# Patient Record
Sex: MALE | Race: WHITE | Employment: FULL TIME | URBAN - METROPOLITAN AREA
[De-identification: names, ages, dates, MRNs, and addresses within clinical notes are randomized per-mention and may not be internally consistent; named-entity substitution may affect disease eponyms.]

---

## 2017-06-27 ENCOUNTER — THERAPY VISIT (OUTPATIENT)
Dept: PHYSICAL THERAPY | Facility: CLINIC | Age: 27
End: 2017-06-27
Payer: COMMERCIAL

## 2017-06-27 DIAGNOSIS — M67.88 ACHILLES TENDONOSIS OF LEFT LOWER EXTREMITY: Primary | ICD-10-CM

## 2017-06-27 PROCEDURE — 97035 APP MDLTY 1+ULTRASOUND EA 15: CPT | Mod: GP | Performed by: PHYSICAL THERAPIST

## 2017-06-27 PROCEDURE — 97110 THERAPEUTIC EXERCISES: CPT | Mod: GP | Performed by: PHYSICAL THERAPIST

## 2017-06-27 PROCEDURE — 97161 PT EVAL LOW COMPLEX 20 MIN: CPT | Mod: GP | Performed by: PHYSICAL THERAPIST

## 2017-06-27 PROCEDURE — 97140 MANUAL THERAPY 1/> REGIONS: CPT | Mod: GP | Performed by: PHYSICAL THERAPIST

## 2017-06-27 NOTE — MR AVS SNAPSHOT
After Visit Summary   6/27/2017    Yuniel Sultana    MRN: [id number]           Patient Information     Date Of Birth          1990        Visit Information        Provider Department      6/27/2017 4:50 PM Jemal Hendrix, [facility] [place] [facility]        Today's Diagnoses     Achilles tendonosis of left lower extremity    -  1       Follow-ups after your visit        Your next 10 appointments already scheduled     Jul 07, 2017  3:10 PM CDT   KERRIE Extremity with Jemal Hendrix PT   [facility] [place] [facility] ([facility] [place])    [address] [place] [address]  [address]  [place] [address]   [phone]            Jul 10, 2017  3:50 PM CDT   KERRIE Extremity with Jemal Hendrix PT   [facility] [place] [facility] ([facility] [place])    [address] [place] [address]  [address]  [place] [address]   [phone]            Jul 12, 2017  4:30 PM CDT   KERRIE Extremity with Jemal Hendrix PT   [facility] [place] [facility] ([facility] [place])    [address] [place] [address]  [address]  [place] [address]   [phone]              Who to contact     If you have questions or need follow up information about today's clinic visit or your schedule please contact [facility] directly at [phone].  Normal or non-critical lab and imaging results will be communicated to you by MyChart, letter or phone within 4 business days after the clinic has received the results. If you do not hear from us within 7 days, please contact the clinic through [email or web address] or phone. If you have a critical or abnormal lab result, we will notify you by phone as soon as possible.  Submit refill requests through [employer] or call your pharmacy and they will forward the refill request to us. Please allow 3 business days for your refill to be completed.          Additional  "Information About Your Visit        MyChart Information     [employer] lets you send messages to your doctor, view your test results, renew your prescriptions, schedule appointments and more. To sign up, go to [email or web address] . Click on \"Log in\" on the left side of the screen, which will take you to the Welcome page. Then click on \"Sign up Now\" on the right side of the page.     You will be asked to enter the access code listed below, as well as some personal information. Please follow the directions to create your username and password.     Your access code is: [email or web address]  Expires: 2017  8:14 AM     Your access code will  in 90 days. If you need help or a new code, please call your [facility] clinic or [phone].        Care EveryWhere ID     This is your Care EveryWhere ID. This could be used by other organizations to access your [facility] medical records  [id number]         Blood Pressure from Last 3 Encounters:   10/03/16 110/80   14 118/70   13 147/82    Weight from Last 3 Encounters:   10/03/16 78 kg (172 lb)   13 74.8 kg (165 lb)              We Performed the Following     HC PT EVAL, LOW COMPLEXITY     KERRIE INITIAL EVAL REPORT     MANUAL THER TECH,1+REGIONS,EA 15 MIN     THERAPEUTIC EXERCISES     ULTRASOUND THERAPY        Primary Care Provider Office Phone # Fax #    Leonora Terry [phone]       [facility] [address] [place] [address]        Equal Access to Services     TREMAYNE BENJAMIN : Hadii bethany saravia hadlittleo Sokleberali, waaxda luqadaha, qaybta kaalmada adeegyada, jem tidwell. So [facility] [phone].    ATENCIÓN: Si habla español, tiene a magaña disposición servicios gratuitos de asistencia lingüística. Llame al [phone].    We comply with applicable federal civil rights laws and [place] laws. We do not discriminate on the basis of race, color, national origin, age, disability sex, sexual orientation or " gender identity.            Thank you!     Thank you for choosing [facility]  [facility]. Our goal is always to provide you with excellent care. Hearing back from our patients is one way we can continue to improve our services. Please take a few minutes to complete the written survey that you may receive in the mail after your visit with us. Thank you!             Your Updated Medication List - Protect others around you: Learn how to safely use, store and throw away your medicines at www.disposemymeds.org.          This list is accurate as of: 6/27/17 11:59 PM.  Always use your most recent med list.                   Brand Name Dispense Instructions for use Diagnosis    nabumetone 500 MG tablet    RELAFEN    30 tablet    Take 1-2 tablets (500-1,000 mg) by mouth 2 times daily as needed for moderate pain    Other back pain, unspecified chronicity, Left Achilles tendinitis       SYNTHROID PO      Take  by mouth.

## 2017-06-28 PROBLEM — M67.88 ACHILLES TENDONOSIS OF LEFT LOWER EXTREMITY: Status: ACTIVE | Noted: 2017-06-28

## 2017-06-28 NOTE — PROGRESS NOTES
Subjective:    Patient is a 27 year old male presenting with rehab left ankle/foot hpi.   Yuniel Sultana is a 27 year old male with a left ankle condition.  Condition occurred with:  Insidious onset.  Condition occurred: for unknown reasons.  This is a chronic condition  Patient is referred with a 10 month hx of L Achilles pain which worsened in the past 4 months. He notes pain in the morning and after sitting when attempting to walk. Descending stairs also increases pain..    Patient reports pain:  Posterior (L Achilles MT jct).  Radiates to:  No radiation.  Pain is described as sharp and is intermittent and reported as 9/10.  Associated symptoms:  Loss of strength and edema. Pain is worse during the day.  Symptoms are exacerbated by activity, walking and descending stairs and relieved by nothing.  Since onset symptoms are unchanged.        General health as reported by patient is excellent.          Current occupation is [employer].  Patient is working in normal job without restrictions.          Red flags:  None as reported by patient.                        Objective:    Standing Alignment:                Ankle/foot deviations: Mild hyperpronation B.    Gait:    Gait Type:  Antalgic     Deviations:  Ankle:  Pronation incr L, pronation incr R and push off decr L          Ankle/Foot Evaluation  ROM:  AROM is normal.PROM is normal.      Strength:    Dorsiflexion:  Left: 5/5     Pain:   Right: 5/5   Pain:  Plantarflexion: Left: 4/5    Pain:++   Right: 5/5  Pain:  Inversion:Left: 5/5  Pain:     Right: 5/5  Pain:  Eversion:Left: 5/5  Pain:  Right: 5/5  Pain:                  LIGAMENT TESTING: not assessed              SPECIAL TESTS: not assessed    PALPATION:   Left ankle tenderness present at:  achilles tendon    EDEMA: Edema ankle: Mild thickening at MT jct left.          MOBILITY TESTING: normal              FUNCTIONAL TESTS: not assessed                                                              General      ROS    Assessment/Plan:      Patient is a 27 year old male with left side ankle complaints.    Patient has the following significant findings with corresponding treatment plan.                Diagnosis 1:  Left Achilles tendinosis  Pain -  US, electric stimulation, manual therapy, splint/taping/bracing/orthotics, self management, education and home program  Decreased strength - therapeutic exercise, therapeutic activities and home program  Impaired gait - gait training and home program  Impaired muscle performance - neuro re-education and home program  Decreased function - therapeutic activities and home program    Therapy Evaluation Codes:   1) History comprised of:   Personal factors that impact the plan of care:      None.    Comorbidity factors that impact the plan of care are:      None.     Medications impacting care: None.  2) Examination of Body Systems comprised of:   Body structures and functions that impact the plan of care:      Ankle.   Activity limitations that impact the plan of care are:      Jumping, Sports, Stairs, Walking and Working.  3) Clinical presentation characteristics are:   Stable/Uncomplicated.  4) Decision-Making    Low complexity using standardized patient assessment instrument and/or measureable assessment of functional outcome.  Cumulative Therapy Evaluation is: Low complexity.    Previous and current functional limitations:  (See Goal Flow Sheet for this information)    Short term and Long term goals: (See Goal Flow Sheet for this information)     Communication ability:  Patient appears to be able to clearly communicate and understand verbal and written communication and follow directions correctly.  Treatment Explanation - The following has been discussed with the patient:   RX ordered/plan of care  Anticipated outcomes  Possible risks and side effects  This patient would benefit from PT intervention to resume normal activities.   Rehab potential is excellent.    Frequency:  2  X week, once daily  Duration:  for 4 weeks  Discharge Plan:  Achieve all LTG.  Independent in home treatment program.  Reach maximal therapeutic benefit.    Please refer to the daily flowsheet for treatment today, total treatment time and time spent performing 1:1 timed codes.

## 2017-07-07 ENCOUNTER — THERAPY VISIT (OUTPATIENT)
Dept: PHYSICAL THERAPY | Facility: CLINIC | Age: 27
End: 2017-07-07
Payer: COMMERCIAL

## 2017-07-07 DIAGNOSIS — M67.88 ACHILLES TENDONOSIS OF LEFT LOWER EXTREMITY: ICD-10-CM

## 2017-07-07 PROCEDURE — 97110 THERAPEUTIC EXERCISES: CPT | Mod: GP | Performed by: PHYSICAL THERAPIST

## 2017-07-07 PROCEDURE — 97140 MANUAL THERAPY 1/> REGIONS: CPT | Mod: GP | Performed by: PHYSICAL THERAPIST

## 2017-07-07 PROCEDURE — 97035 APP MDLTY 1+ULTRASOUND EA 15: CPT | Mod: GP | Performed by: PHYSICAL THERAPIST

## 2017-07-10 ENCOUNTER — THERAPY VISIT (OUTPATIENT)
Dept: PHYSICAL THERAPY | Facility: CLINIC | Age: 27
End: 2017-07-10
Payer: COMMERCIAL

## 2017-07-10 DIAGNOSIS — M67.88 ACHILLES TENDONOSIS OF LEFT LOWER EXTREMITY: ICD-10-CM

## 2017-07-10 PROCEDURE — 97035 APP MDLTY 1+ULTRASOUND EA 15: CPT | Mod: GP | Performed by: PHYSICAL THERAPIST

## 2017-07-10 PROCEDURE — 97110 THERAPEUTIC EXERCISES: CPT | Mod: GP | Performed by: PHYSICAL THERAPIST

## 2017-07-10 PROCEDURE — 97140 MANUAL THERAPY 1/> REGIONS: CPT | Mod: GP | Performed by: PHYSICAL THERAPIST

## 2017-07-17 ENCOUNTER — THERAPY VISIT (OUTPATIENT)
Dept: PHYSICAL THERAPY | Facility: CLINIC | Age: 27
End: 2017-07-17
Payer: COMMERCIAL

## 2017-07-17 DIAGNOSIS — M67.88 ACHILLES TENDONOSIS OF LEFT LOWER EXTREMITY: ICD-10-CM

## 2017-07-17 PROCEDURE — 97110 THERAPEUTIC EXERCISES: CPT | Mod: GP | Performed by: PHYSICAL THERAPIST

## 2017-07-17 PROCEDURE — 97140 MANUAL THERAPY 1/> REGIONS: CPT | Mod: GP | Performed by: PHYSICAL THERAPIST

## 2017-07-17 PROCEDURE — 97014 ELECTRIC STIMULATION THERAPY: CPT | Mod: GP | Performed by: PHYSICAL THERAPIST

## 2017-07-17 PROCEDURE — 97010 HOT OR COLD PACKS THERAPY: CPT | Mod: GP | Performed by: PHYSICAL THERAPIST

## 2017-07-21 ENCOUNTER — THERAPY VISIT (OUTPATIENT)
Dept: PHYSICAL THERAPY | Facility: CLINIC | Age: 27
End: 2017-07-21
Payer: COMMERCIAL

## 2017-07-21 DIAGNOSIS — M67.88 ACHILLES TENDONOSIS OF LEFT LOWER EXTREMITY: ICD-10-CM

## 2017-07-21 PROCEDURE — 97140 MANUAL THERAPY 1/> REGIONS: CPT | Mod: GP | Performed by: PHYSICAL THERAPIST

## 2017-07-21 PROCEDURE — 97010 HOT OR COLD PACKS THERAPY: CPT | Mod: GP | Performed by: PHYSICAL THERAPIST

## 2017-07-21 PROCEDURE — 97014 ELECTRIC STIMULATION THERAPY: CPT | Mod: GP | Performed by: PHYSICAL THERAPIST

## 2017-07-21 PROCEDURE — 97530 THERAPEUTIC ACTIVITIES: CPT | Mod: GP | Performed by: PHYSICAL THERAPIST

## 2017-07-24 ENCOUNTER — THERAPY VISIT (OUTPATIENT)
Dept: PHYSICAL THERAPY | Facility: CLINIC | Age: 27
End: 2017-07-24
Payer: COMMERCIAL

## 2017-07-24 DIAGNOSIS — M67.88 ACHILLES TENDONOSIS OF LEFT LOWER EXTREMITY: ICD-10-CM

## 2017-07-24 PROCEDURE — 97110 THERAPEUTIC EXERCISES: CPT | Mod: GP | Performed by: PHYSICAL THERAPIST

## 2017-07-24 PROCEDURE — 97140 MANUAL THERAPY 1/> REGIONS: CPT | Mod: GP | Performed by: PHYSICAL THERAPIST

## 2017-07-28 ENCOUNTER — THERAPY VISIT (OUTPATIENT)
Dept: PHYSICAL THERAPY | Facility: CLINIC | Age: 27
End: 2017-07-28
Payer: COMMERCIAL

## 2017-07-28 DIAGNOSIS — M67.88 ACHILLES TENDONOSIS OF LEFT LOWER EXTREMITY: ICD-10-CM

## 2017-07-28 PROCEDURE — 97110 THERAPEUTIC EXERCISES: CPT | Mod: GP | Performed by: PHYSICAL THERAPIST

## 2017-07-28 PROCEDURE — 97140 MANUAL THERAPY 1/> REGIONS: CPT | Mod: GP | Performed by: PHYSICAL THERAPIST

## 2017-07-31 ENCOUNTER — THERAPY VISIT (OUTPATIENT)
Dept: PHYSICAL THERAPY | Facility: CLINIC | Age: 27
End: 2017-07-31
Payer: COMMERCIAL

## 2017-07-31 DIAGNOSIS — M67.88 ACHILLES TENDONOSIS OF LEFT LOWER EXTREMITY: ICD-10-CM

## 2017-07-31 PROCEDURE — 97530 THERAPEUTIC ACTIVITIES: CPT | Mod: GP | Performed by: PHYSICAL THERAPIST

## 2017-07-31 PROCEDURE — 97140 MANUAL THERAPY 1/> REGIONS: CPT | Mod: GP | Performed by: PHYSICAL THERAPIST

## 2017-07-31 PROCEDURE — 97010 HOT OR COLD PACKS THERAPY: CPT | Mod: GP | Performed by: PHYSICAL THERAPIST

## 2017-07-31 PROCEDURE — 97014 ELECTRIC STIMULATION THERAPY: CPT | Mod: GP | Performed by: PHYSICAL THERAPIST

## 2017-08-04 ENCOUNTER — THERAPY VISIT (OUTPATIENT)
Dept: PHYSICAL THERAPY | Facility: CLINIC | Age: 27
End: 2017-08-04
Payer: COMMERCIAL

## 2017-08-04 DIAGNOSIS — M67.88 ACHILLES TENDONOSIS OF LEFT LOWER EXTREMITY: ICD-10-CM

## 2017-08-04 PROCEDURE — 97140 MANUAL THERAPY 1/> REGIONS: CPT | Mod: GP | Performed by: PHYSICAL THERAPIST

## 2017-08-04 PROCEDURE — 97035 APP MDLTY 1+ULTRASOUND EA 15: CPT | Mod: GP | Performed by: PHYSICAL THERAPIST

## 2017-08-04 PROCEDURE — 97014 ELECTRIC STIMULATION THERAPY: CPT | Mod: GP | Performed by: PHYSICAL THERAPIST

## 2017-08-04 PROCEDURE — 97010 HOT OR COLD PACKS THERAPY: CPT | Mod: GP | Performed by: PHYSICAL THERAPIST

## 2017-08-04 PROCEDURE — 97530 THERAPEUTIC ACTIVITIES: CPT | Mod: GP | Performed by: PHYSICAL THERAPIST

## 2017-10-01 ENCOUNTER — HOSPITAL ENCOUNTER (EMERGENCY)
Facility: CLINIC | Age: 27
Discharge: HOME | End: 2017-10-01
Attending: EMERGENCY MEDICINE | Admitting: EMERGENCY MEDICINE
Payer: COMMERCIAL

## 2017-10-01 ENCOUNTER — APPOINTMENT (OUTPATIENT)
Dept: CT IMAGING | Facility: CLINIC | Age: 27
End: 2017-10-01
Attending: EMERGENCY MEDICINE
Payer: COMMERCIAL

## 2017-10-01 ENCOUNTER — APPOINTMENT (OUTPATIENT)
Dept: GENERAL RADIOLOGY | Facility: CLINIC | Age: 27
End: 2017-10-01
Attending: EMERGENCY MEDICINE
Payer: COMMERCIAL

## 2017-10-01 VITALS
RESPIRATION RATE: 16 BRPM | SYSTOLIC BLOOD PRESSURE: 130 MMHG | HEART RATE: 63 BPM | OXYGEN SATURATION: 98 % | TEMPERATURE: 98.5 F | DIASTOLIC BLOOD PRESSURE: 79 MMHG

## 2017-10-01 DIAGNOSIS — M25.521 RIGHT ELBOW PAIN: ICD-10-CM

## 2017-10-01 DIAGNOSIS — S06.0X1A CONCUSSION WITH LOSS OF CONSCIOUSNESS OF 30 MINUTES OR LESS, INITIAL ENCOUNTER: ICD-10-CM
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
## 2017-10-01 PROCEDURE — 99284 EMERGENCY DEPT VISIT MOD MDM: CPT | Mod: 25

## 2017-10-01 PROCEDURE — 73070 X-RAY EXAM OF ELBOW: CPT | Mod: RT

## 2017-10-01 PROCEDURE — 70450 CT HEAD/BRAIN W/O DYE: CPT

## 2017-10-01 ASSESSMENT — ENCOUNTER SYMPTOMS
COUGH: 0
ABDOMINAL PAIN: 0
FACIAL SWELLING: 1
FEVER: 0
FLANK PAIN: 0
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
## 2017-10-01 NOTE — ED AVS SNAPSHOT
[facility] Emergency Department    [address]    [facility] [address]    Phone:  [phone]    Fax:  [phone]                                       Yuniel Sultana   MRN: [id number]    Department:  [facility] Emergency Department   Date of Visit:  10/1/2017           After Visit Summary Signature Page     I have received my discharge instructions, and my questions have been answered. I have discussed any challenges I see with this plan with the nurse or doctor.    ..........................................................................................................................................  Patient/Patient Representative Signature      ..........................................................................................................................................  Patient Representative Print Name and Relationship to Patient    ..................................................               ................................................  Date                                            Time    ..........................................................................................................................................  Reviewed by Signature/Title    ...................................................              ..............................................  Date                                                            Time
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
## 2017-10-01 NOTE — DISCHARGE INSTRUCTIONS
Discharge Instructions  Concussion    You were seen today for signs of a concussion.  The symptoms will vary, depending on the nature of your injury and your health. You may have: headache, confusion, nausea (feel sick to your stomach), vomiting (throwing up) and problems with memory, concentrating, or sleep. You may feel dizzy, irritable, and tired. Children and teens may need help from their parents, teachers, and coaches to watch for symptoms as they recover.    Generally, every Emergency Department visit should have a follow-up clinic visit with either a primary or a specialty clinic/provider. Please follow-up as instructed by your emergency provider today.     Return to the Emergency Department if:    Your headache gets worse or you start to have a really bad headache even with the recommended treatment plan.     You feel drowsier, have growing confusion, or slurred speech.     You keep repeating yourself.     You have strange behavior or are feeling more irritable.     You have a seizure.     You vomit (throw up) more than once.     You have trouble walking.     You have weakness or numbness.    Your neck pain gets worse.     You have a loss of consciousness.     You have blood for fluid coming from your ears or nose.     You have new symptoms or anything that worries you.     Home Care:    Get lots of rest and get enough sleep at night. Take daytime naps or rest if you feel tired.     Limit physical activity and  thinking  activities. These can make symptoms worse.   o Physical activities include gym, sports, weight training, running, exercise, and heavy lifting.   o Thinking activities include homework, class work, job-related work, and screen time (phone, computer, tablet, TV, and video games).     Stick to a healthy diet and drink lots of fluids. Avoid alcohol.    As symptoms improve, you may slowly return to your daily activities. If symptoms get worse or return, reduce your activity.     Know that it is  normal to feel sad or frustrated when you do not feel right and are less active.     Going Back to Work:    Your care team will tell you when you are ready to return to work.      Limit the amount of work you do soon after your injury. This may speed healing. Take breaks if your symptoms get worse. You should also reduce your physical activity as well as activities that require a lot of thinking until you see your doctor. You may need shorter work days and a lighter workload.  Avoid heavy lifting, working with machinery, driving and working at heights until your symptoms are gone or you are cleared by a provider.    Going Back to School:    If you are still having symptoms, you may need extra help at school.    Tell your teachers and school nurse about your injury and symptoms. Ask them to watch for problems with learning, memory, and concentrating. Symptoms may get worse when you do schoolwork, and you may become more irritable. You may need shorter school days, a reduced workload, and to postpone testing.  Do not drive or take gym class (physical activity) until cleared by a provider.    Returning to Sports:    Never return to play if you have any symptoms. A full recovery will reduce the chances of getting hurt again. Remember, it is better to miss one or two games than a whole season.    You should rest from all physical activity until you see your provider. Generally, if all symptoms have completely cleared, your provider can help guide you to slowly return to sports. If symptoms return or worsen, stop the activity and see your provider.    Important: If you are in an organized sport and under age 18, you will need written consent from a healthcare provider before you return to sports. Typically, this will be your primary care or sports medicine provider. Please make an appointment.    If you were given a prescription for medicine here today, be sure to read all of the information (including the package insert)  that comes with your prescription.  This will include important information about the medicine, its side effects, and any warnings that you need to know about.  The pharmacist who fills the prescription can provide more information and answer questions you may have about the medicine.  If you have questions or concerns that the pharmacist cannot address, please call or return to the Emergency Department.     Remember that you can always come back to the Emergency Department if you are not able to see your regular provider in the amount of time listed above, if you get any new symptoms, or if there is anything that worries you.

## 2018-07-09 ENCOUNTER — HOSPITAL ENCOUNTER (EMERGENCY)
Facility: CLINIC | Age: 28
Discharge: HOME | End: 2018-07-10
Attending: EMERGENCY MEDICINE | Admitting: EMERGENCY MEDICINE
Payer: COMMERCIAL

## 2018-07-09 ENCOUNTER — APPOINTMENT (OUTPATIENT)
Dept: GENERAL RADIOLOGY | Facility: CLINIC | Age: 28
End: 2018-07-09
Attending: EMERGENCY MEDICINE
Payer: COMMERCIAL

## 2018-07-09 ENCOUNTER — APPOINTMENT (OUTPATIENT)
Dept: CT IMAGING | Facility: CLINIC | Age: 28
End: 2018-07-09
Attending: EMERGENCY MEDICINE
Payer: COMMERCIAL

## 2018-07-09 VITALS
WEIGHT: 170 LBS | DIASTOLIC BLOOD PRESSURE: 44 MMHG | SYSTOLIC BLOOD PRESSURE: 108 MMHG | BODY MASS INDEX: 24.34 KG/M2 | TEMPERATURE: 98.1 F | OXYGEN SATURATION: 99 % | RESPIRATION RATE: 16 BRPM | HEIGHT: 70 IN
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
## 2018-07-09 DIAGNOSIS — H05.231 PERIORBITAL HEMATOMA OF RIGHT EYE: ICD-10-CM

## 2018-07-09 DIAGNOSIS — S61.012A LACERATION OF LEFT THUMB WITHOUT FOREIGN BODY, NAIL DAMAGE STATUS UNSPECIFIED, INITIAL ENCOUNTER: ICD-10-CM

## 2018-07-09 DIAGNOSIS — T07.XXXA ABRASIONS OF MULTIPLE SITES: ICD-10-CM

## 2018-07-09 LAB
ALBUMIN SERPL-MCNC: 4 G/DL (ref 3.4–5)
ALP SERPL-CCNC: 57 U/L (ref 40–150)
ALT SERPL W P-5'-P-CCNC: 30 U/L (ref 0–70)
ANION GAP SERPL CALCULATED.3IONS-SCNC: 8 MMOL/L (ref 3–14)
AST SERPL W P-5'-P-CCNC: 24 U/L (ref 0–45)
BASOPHILS # BLD AUTO: 0.1 10E9/L (ref 0–0.2)
BASOPHILS NFR BLD AUTO: 0.4 %
BILIRUB SERPL-MCNC: 0.4 MG/DL (ref 0.2–1.3)
BUN SERPL-MCNC: 21 MG/DL (ref 7–30)
CALCIUM SERPL-MCNC: 8.7 MG/DL (ref 8.5–10.1)
CHLORIDE SERPL-SCNC: 107 MMOL/L (ref 94–109)
CO2 SERPL-SCNC: 25 MMOL/L (ref 20–32)
CREAT SERPL-MCNC: 1.24 MG/DL (ref 0.66–1.25)
DIFFERENTIAL METHOD BLD: ABNORMAL
EOSINOPHIL # BLD AUTO: 0.2 10E9/L (ref 0–0.7)
EOSINOPHIL NFR BLD AUTO: 2 %
ERYTHROCYTE [DISTWIDTH] IN BLOOD BY AUTOMATED COUNT: 12.7 % (ref 10–15)
GFR SERPL CREATININE-BSD FRML MDRD: 69 ML/MIN/1.7M2
GLUCOSE SERPL-MCNC: 114 MG/DL (ref 70–99)
HCT VFR BLD AUTO: 42.2 % (ref 40–53)
HGB BLD-MCNC: 14.7 G/DL (ref 13.3–17.7)
IMM GRANULOCYTES # BLD: 0 10E9/L (ref 0–0.4)
IMM GRANULOCYTES NFR BLD: 0.2 %
LYMPHOCYTES # BLD AUTO: 2.4 10E9/L (ref 0.8–5.3)
LYMPHOCYTES NFR BLD AUTO: 19.6 %
MCH RBC QN AUTO: 31.3 PG (ref 26.5–33)
MCHC RBC AUTO-ENTMCNC: 34.8 G/DL (ref 31.5–36.5)
MCV RBC AUTO: 90 FL (ref 78–100)
MONOCYTES # BLD AUTO: 0.6 10E9/L (ref 0–1.3)
MONOCYTES NFR BLD AUTO: 4.9 %
NEUTROPHILS # BLD AUTO: 9 10E9/L (ref 1.6–8.3)
NEUTROPHILS NFR BLD AUTO: 72.9 %
NRBC # BLD AUTO: 0 10*3/UL
NRBC BLD AUTO-RTO: 0 /100
PLATELET # BLD AUTO: 247 10E9/L (ref 150–450)
POTASSIUM SERPL-SCNC: 3.4 MMOL/L (ref 3.4–5.3)
PROT SERPL-MCNC: 7 G/DL (ref 6.8–8.8)
RBC # BLD AUTO: 4.7 10E12/L (ref 4.4–5.9)
SODIUM SERPL-SCNC: 140 MMOL/L (ref 133–144)
TROPONIN I SERPL-MCNC: <0.015 UG/L (ref 0–0.04)
WBC # BLD AUTO: 12.3 10E9/L (ref 4–11)

## 2018-07-09 PROCEDURE — 85025 COMPLETE CBC W/AUTO DIFF WBC: CPT | Performed by: EMERGENCY MEDICINE

## 2018-07-09 PROCEDURE — 76705 ECHO EXAM OF ABDOMEN: CPT

## 2018-07-09 PROCEDURE — 96374 THER/PROPH/DIAG INJ IV PUSH: CPT

## 2018-07-09 PROCEDURE — 71046 X-RAY EXAM CHEST 2 VIEWS: CPT

## 2018-07-09 PROCEDURE — 96361 HYDRATE IV INFUSION ADD-ON: CPT

## 2018-07-09 PROCEDURE — 72170 X-RAY EXAM OF PELVIS: CPT

## 2018-07-09 PROCEDURE — 99285 EMERGENCY DEPT VISIT HI MDM: CPT | Mod: 25

## 2018-07-09 PROCEDURE — 84484 ASSAY OF TROPONIN QUANT: CPT | Performed by: EMERGENCY MEDICINE

## 2018-07-09 PROCEDURE — 70450 CT HEAD/BRAIN W/O DYE: CPT

## 2018-07-09 PROCEDURE — 12002 RPR S/N/AX/GEN/TRNK2.6-7.5CM: CPT

## 2018-07-09 PROCEDURE — 80053 COMPREHEN METABOLIC PANEL: CPT | Performed by: EMERGENCY MEDICINE

## 2018-07-09 PROCEDURE — 73130 X-RAY EXAM OF HAND: CPT | Mod: 50

## 2018-07-09 PROCEDURE — 73630 X-RAY EXAM OF FOOT: CPT | Mod: LT

## 2018-07-09 PROCEDURE — 25800030 ZZH RX IP 258 OP 636: Performed by: EMERGENCY MEDICINE

## 2018-07-09 PROCEDURE — 25000128 H RX IP 250 OP 636: Performed by: EMERGENCY MEDICINE

## 2018-07-09 RX ORDER — HYDROMORPHONE HYDROCHLORIDE 1 MG/ML
0.5 INJECTION, SOLUTION INTRAMUSCULAR; INTRAVENOUS; SUBCUTANEOUS ONCE
Status: COMPLETED | OUTPATIENT
Start: 2018-07-09 | End: 2018-07-09

## 2018-07-09 RX ORDER — HYDROMORPHONE HYDROCHLORIDE 1 MG/ML
0.5 INJECTION, SOLUTION INTRAMUSCULAR; INTRAVENOUS; SUBCUTANEOUS ONCE
Status: COMPLETED | OUTPATIENT
Start: 2018-07-09 | End: 2018-07-10

## 2018-07-09 RX ORDER — SODIUM CHLORIDE 9 MG/ML
1000 INJECTION, SOLUTION INTRAVENOUS CONTINUOUS
Status: DISCONTINUED | OUTPATIENT
Start: 2018-07-09 | End: 2018-07-10 | Stop reason: HOSPADM

## 2018-07-09 RX ORDER — HYDROMORPHONE HYDROCHLORIDE 1 MG/ML
0.5 INJECTION, SOLUTION INTRAMUSCULAR; INTRAVENOUS; SUBCUTANEOUS
Status: DISCONTINUED | OUTPATIENT
Start: 2018-07-09 | End: 2018-07-09

## 2018-07-09 RX ADMIN — SODIUM CHLORIDE 1000 ML: 9 INJECTION, SOLUTION INTRAVENOUS at 22:23

## 2018-07-09 RX ADMIN — HYDROMORPHONE HYDROCHLORIDE 0.5 MG: 10 INJECTION, SOLUTION INTRAMUSCULAR; INTRAVENOUS; SUBCUTANEOUS at 22:38

## 2018-07-09 ASSESSMENT — ENCOUNTER SYMPTOMS
ARTHRALGIAS: 1
HEADACHES: 0
WOUND: 1
MYALGIAS: 1
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
## 2018-07-09 NOTE — ED AVS SNAPSHOT
Emergency Department    [address] [place] [address]    Phone:  [phone]    Fax:  [phone]                                       Yuniel Sultana   MRN: [id number]    Department:   Emergency Department   Date of Visit:  7/9/2018           After Visit Summary Signature Page     I have received my discharge instructions, and my questions have been answered. I have discussed any challenges I see with this plan with the nurse or doctor.    ..........................................................................................................................................  Patient/Patient Representative Signature      ..........................................................................................................................................  Patient Representative Print Name and Relationship to Patient    ..................................................               ................................................  Date                                            Time    ..........................................................................................................................................  Reviewed by Signature/Title    ...................................................              ..............................................  Date                                                            Time

## 2018-07-10 ENCOUNTER — NURSE TRIAGE (OUTPATIENT)
Dept: NURSING | Facility: CLINIC | Age: 28
End: 2018-07-10

## 2018-07-10 ENCOUNTER — TELEPHONE (OUTPATIENT)
Dept: FAMILY MEDICINE | Facility: CLINIC | Age: 28
End: 2018-07-10

## 2018-07-10 LAB
ALBUMIN UR-MCNC: 30 MG/DL
APPEARANCE UR: CLEAR
BILIRUB UR QL STRIP: NEGATIVE
COLOR UR AUTO: YELLOW
GLUCOSE UR STRIP-MCNC: NEGATIVE MG/DL
HGB UR QL STRIP: NEGATIVE
HYALINE CASTS #/AREA URNS LPF: 7 /LPF (ref 0–2)
KETONES UR STRIP-MCNC: 5 MG/DL
LEUKOCYTE ESTERASE UR QL STRIP: NEGATIVE
MUCOUS THREADS #/AREA URNS LPF: PRESENT /LPF
NITRATE UR QL: NEGATIVE
PH UR STRIP: 5.5 PH (ref 5–7)
RBC #/AREA URNS AUTO: <1 /HPF (ref 0–2)
SOURCE: ABNORMAL
SP GR UR STRIP: 1.02 (ref 1–1.03)
UROBILINOGEN UR STRIP-MCNC: NORMAL MG/DL (ref 0–2)
WBC #/AREA URNS AUTO: 1 /HPF (ref 0–5)

## 2018-07-10 PROCEDURE — 25000128 H RX IP 250 OP 636: Performed by: EMERGENCY MEDICINE

## 2018-07-10 PROCEDURE — 96376 TX/PRO/DX INJ SAME DRUG ADON: CPT

## 2018-07-10 PROCEDURE — 81001 URINALYSIS AUTO W/SCOPE: CPT | Performed by: EMERGENCY MEDICINE

## 2018-07-10 PROCEDURE — 90715 TDAP VACCINE 7 YRS/> IM: CPT | Performed by: EMERGENCY MEDICINE

## 2018-07-10 PROCEDURE — 90471 IMMUNIZATION ADMIN: CPT

## 2018-07-10 RX ORDER — CEPHALEXIN 500 MG/1
500 CAPSULE ORAL 3 TIMES DAILY
Qty: 15 CAPSULE | Refills: 0 | Status: SHIPPED | OUTPATIENT
Start: 2018-07-10 | End: 2018-07-11

## 2018-07-10 RX ADMIN — CLOSTRIDIUM TETANI TOXOID ANTIGEN (FORMALDEHYDE INACTIVATED), CORYNEBACTERIUM DIPHTHERIAE TOXOID ANTIGEN (FORMALDEHYDE INACTIVATED), BORDETELLA PERTUSSIS TOXOID ANTIGEN (GLUTARALDEHYDE INACTIVATED), BORDETELLA PERTUSSIS FILAMENTOUS HEMAGGLUTININ ANTIGEN (FORMALDEHYDE INACTIVATED), BORDETELLA PERTUSSIS PERTACTIN ANTIGEN, AND BORDETELLA PERTUSSIS FIMBRIAE 2/3 ANTIGEN 0.5 ML: 5; 2; 2.5; 5; 3; 5 INJECTION, SUSPENSION INTRAMUSCULAR at 00:06

## 2018-07-10 RX ADMIN — Medication 0.5 MG: at 00:04

## 2018-07-10 NOTE — TELEPHONE ENCOUNTER
Non-detailed message left to return our call.  Alysa Jefferson RN - Triage  [facility]

## 2018-07-10 NOTE — TELEPHONE ENCOUNTER
Milena calling, patient present.  Patient seen at ED yesterday and was prescribed antibiotic.  Patient lost antibiotic and spoke with ED about replacing it.  ED stated unable to and transferred caller to FNA.  FNA advised for patient to establish care and be seen at clinic.  Transferred to scheduling for appointment as soon as possible.
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
## 2018-07-10 NOTE — DISCHARGE INSTRUCTIONS
Return to the emergency department or seek medical care as instructed if your symptoms fail to improve or significantly worsen.    Take Acetaminophen (aka Tylenol) and/or ibuprofen (aka Motrin/Advil, 600mg up to 4 times per day with food) as needed for symptom/pain relief; use as directed.    Ice area of pain for 20 minutes four times per day for the next two days    Take antibiotics as prescribed; complete entire course as directed.    Follow-up as indicated on page 1.  Maintain adequate hydration and get plenty of rest.

## 2018-07-10 NOTE — TELEPHONE ENCOUNTER
Spoke with patient and scheduled RN visit.   Merlene Holguin RN   [facility] - Triage
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
## 2018-07-11 ENCOUNTER — OFFICE VISIT (OUTPATIENT)
Dept: FAMILY MEDICINE | Facility: CLINIC | Age: 28
End: 2018-07-11
Payer: COMMERCIAL

## 2018-07-11 VITALS
BODY MASS INDEX: 24.82 KG/M2 | DIASTOLIC BLOOD PRESSURE: 68 MMHG | TEMPERATURE: 98.7 F | HEART RATE: 58 BPM | WEIGHT: 173 LBS | SYSTOLIC BLOOD PRESSURE: 105 MMHG | RESPIRATION RATE: 18 BRPM

## 2018-07-11 DIAGNOSIS — M25.532 PAIN IN BOTH WRISTS: ICD-10-CM

## 2018-07-11 DIAGNOSIS — M25.531 PAIN IN BOTH WRISTS: ICD-10-CM

## 2018-07-11 DIAGNOSIS — S90.32XA CONTUSION OF LEFT FOOT, INITIAL ENCOUNTER: ICD-10-CM

## 2018-07-11 DIAGNOSIS — S61.011A LACERATION OF RIGHT THUMB WITHOUT FOREIGN BODY WITHOUT DAMAGE TO NAIL, INITIAL ENCOUNTER: ICD-10-CM
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
## 2018-07-11 DIAGNOSIS — S40.819A ABRASION OF UPPER EXTREMITY, UNSPECIFIED LATERALITY, INITIAL ENCOUNTER: ICD-10-CM

## 2018-07-11 PROCEDURE — 99214 OFFICE O/P EST MOD 30 MIN: CPT | Performed by: PHYSICIAN ASSISTANT

## 2018-07-11 RX ORDER — CEPHALEXIN 500 MG/1
500 CAPSULE ORAL 3 TIMES DAILY
Qty: 15 CAPSULE | Refills: 0 | Status: SHIPPED | OUTPATIENT
Start: 2018-07-11

## 2018-07-11 RX ORDER — SILVER SULFADIAZINE 10 MG/G
CREAM TOPICAL 2 TIMES DAILY
Qty: 85 G | Refills: 1 | Status: SHIPPED | OUTPATIENT
Start: 2018-07-11 | End: 2018-07-18

## 2018-07-11 RX ORDER — HYDROCODONE BITARTRATE AND ACETAMINOPHEN 5; 325 MG/1; MG/1
1 TABLET ORAL EVERY 6 HOURS PRN
Qty: 15 TABLET | Refills: 0 | Status: SHIPPED | OUTPATIENT
Start: 2018-07-11
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
## 2018-07-17 ENCOUNTER — ALLIED HEALTH/NURSE VISIT (OUTPATIENT)
Dept: NURSING | Facility: CLINIC | Age: 28
End: 2018-07-17
Payer: COMMERCIAL

## 2018-07-17 DIAGNOSIS — Z48.02 VISIT FOR SUTURE REMOVAL: Primary | ICD-10-CM

## 2018-07-17 PROCEDURE — 99207 ZZC NO CHARGE NURSE ONLY: CPT

## 2018-07-17 NOTE — PROGRESS NOTES
Yuniel Sultana presents to the clinic today for removal of sutures.  The patient has had the sutures in place for 7 days.  There has been no history of infection or drainage.  5 sutures are seen located on the left thumb.  The wound is healing well with no signs of infection.  Tetanus status is up to date.   All sutures were easily removed today. Three steri-strips were placed and a large band-aid.  Routine wound care discussed.  The patient will follow up as needed.
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
## 2019-10-11 NOTE — ED NOTES
Discharge instructions gone over with pt, verbalized understanding. Discharged home with plan for follow up and no new prescriptions. Denies questions at time of discharge.    regular rate and rhythm

## 2020-08-02 ENCOUNTER — APPOINTMENT (OUTPATIENT)
Dept: CT IMAGING | Facility: CLINIC | Age: 30
End: 2020-08-02
Attending: PHYSICIAN ASSISTANT
Payer: COMMERCIAL

## 2020-08-02 ENCOUNTER — HOSPITAL ENCOUNTER (EMERGENCY)
Facility: CLINIC | Age: 30
Discharge: HOME | End: 2020-08-02
Attending: PHYSICIAN ASSISTANT | Admitting: PHYSICIAN ASSISTANT
Payer: COMMERCIAL

## 2020-08-02 VITALS
BODY MASS INDEX: 23.62 KG/M2 | OXYGEN SATURATION: 99 % | WEIGHT: 165 LBS | HEIGHT: 70 IN | SYSTOLIC BLOOD PRESSURE: 126 MMHG | TEMPERATURE: 99.3 F | DIASTOLIC BLOOD PRESSURE: 77 MMHG | RESPIRATION RATE: 16 BRPM | HEART RATE: 54 BPM
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
## 2020-08-02 DIAGNOSIS — S42.109A SCAPULAR FRACTURE: ICD-10-CM

## 2020-08-02 LAB
ABO + RH BLD: NORMAL
ABO + RH BLD: NORMAL
ALBUMIN SERPL-MCNC: 3.9 G/DL (ref 3.4–5)
ALP SERPL-CCNC: 67 U/L (ref 40–150)
ALT SERPL W P-5'-P-CCNC: 33 U/L (ref 0–70)
ANION GAP SERPL CALCULATED.3IONS-SCNC: 10 MMOL/L (ref 3–14)
APTT PPP: 30 SEC (ref 22–37)
AST SERPL W P-5'-P-CCNC: 28 U/L (ref 0–45)
BASOPHILS # BLD AUTO: 0.1 10E9/L (ref 0–0.2)
BASOPHILS NFR BLD AUTO: 0.6 %
BILIRUB SERPL-MCNC: 0.4 MG/DL (ref 0.2–1.3)
BLD GP AB SCN SERPL QL: NORMAL
BLOOD BANK CMNT PATIENT-IMP: NORMAL
BUN SERPL-MCNC: 12 MG/DL (ref 7–30)
CALCIUM SERPL-MCNC: 8.6 MG/DL (ref 8.5–10.1)
CHLORIDE SERPL-SCNC: 108 MMOL/L (ref 94–109)
CO2 SERPL-SCNC: 22 MMOL/L (ref 20–32)
CREAT SERPL-MCNC: 0.92 MG/DL (ref 0.66–1.25)
DIFFERENTIAL METHOD BLD: NORMAL
EOSINOPHIL # BLD AUTO: 0.2 10E9/L (ref 0–0.7)
EOSINOPHIL NFR BLD AUTO: 2 %
ERYTHROCYTE [DISTWIDTH] IN BLOOD BY AUTOMATED COUNT: 12.5 % (ref 10–15)
GFR SERPL CREATININE-BSD FRML MDRD: >90 ML/MIN/{1.73_M2}
GLUCOSE SERPL-MCNC: 125 MG/DL (ref 70–99)
HCT VFR BLD AUTO: 46.9 % (ref 40–53)
HGB BLD-MCNC: 15.4 G/DL (ref 13.3–17.7)
IMM GRANULOCYTES # BLD: 0 10E9/L (ref 0–0.4)
IMM GRANULOCYTES NFR BLD: 0.4 %
INR PPP: 1.08 (ref 0.86–1.14)
LYMPHOCYTES # BLD AUTO: 2.1 10E9/L (ref 0.8–5.3)
LYMPHOCYTES NFR BLD AUTO: 19.3 %
MCH RBC QN AUTO: 30.7 PG (ref 26.5–33)
MCHC RBC AUTO-ENTMCNC: 32.8 G/DL (ref 31.5–36.5)
MCV RBC AUTO: 93 FL (ref 78–100)
MONOCYTES # BLD AUTO: 0.5 10E9/L (ref 0–1.3)
MONOCYTES NFR BLD AUTO: 5 %
NEUTROPHILS # BLD AUTO: 7.9 10E9/L (ref 1.6–8.3)
NEUTROPHILS NFR BLD AUTO: 72.7 %
NRBC # BLD AUTO: 0 10*3/UL
NRBC BLD AUTO-RTO: 0 /100
PLATELET # BLD AUTO: 304 10E9/L (ref 150–450)
POTASSIUM SERPL-SCNC: 3.3 MMOL/L (ref 3.4–5.3)
PROT SERPL-MCNC: 7.2 G/DL (ref 6.8–8.8)
RBC # BLD AUTO: 5.02 10E12/L (ref 4.4–5.9)
SODIUM SERPL-SCNC: 140 MMOL/L (ref 133–144)
SPECIMEN EXP DATE BLD: NORMAL
WBC # BLD AUTO: 10.8 10E9/L (ref 4–11)

## 2020-08-02 PROCEDURE — 25000128 H RX IP 250 OP 636: Performed by: PHYSICIAN ASSISTANT

## 2020-08-02 PROCEDURE — 86850 RBC ANTIBODY SCREEN: CPT | Performed by: PHYSICIAN ASSISTANT

## 2020-08-02 PROCEDURE — 85610 PROTHROMBIN TIME: CPT | Performed by: PHYSICIAN ASSISTANT

## 2020-08-02 PROCEDURE — 85730 THROMBOPLASTIN TIME PARTIAL: CPT | Performed by: PHYSICIAN ASSISTANT

## 2020-08-02 PROCEDURE — 85025 COMPLETE CBC W/AUTO DIFF WBC: CPT | Performed by: PHYSICIAN ASSISTANT

## 2020-08-02 PROCEDURE — 72125 CT NECK SPINE W/O DYE: CPT

## 2020-08-02 PROCEDURE — 96374 THER/PROPH/DIAG INJ IV PUSH: CPT

## 2020-08-02 PROCEDURE — 86901 BLOOD TYPING SEROLOGIC RH(D): CPT | Performed by: PHYSICIAN ASSISTANT

## 2020-08-02 PROCEDURE — 96376 TX/PRO/DX INJ SAME DRUG ADON: CPT

## 2020-08-02 PROCEDURE — 86900 BLOOD TYPING SEROLOGIC ABO: CPT | Performed by: PHYSICIAN ASSISTANT

## 2020-08-02 PROCEDURE — 70450 CT HEAD/BRAIN W/O DYE: CPT

## 2020-08-02 PROCEDURE — 80053 COMPREHEN METABOLIC PANEL: CPT | Performed by: PHYSICIAN ASSISTANT

## 2020-08-02 PROCEDURE — 25000132 ZZH RX MED GY IP 250 OP 250 PS 637: Performed by: PHYSICIAN ASSISTANT

## 2020-08-02 PROCEDURE — 99285 EMERGENCY DEPT VISIT HI MDM: CPT | Mod: 25

## 2020-08-02 PROCEDURE — 96361 HYDRATE IV INFUSION ADD-ON: CPT

## 2020-08-02 PROCEDURE — 71260 CT THORAX DX C+: CPT

## 2020-08-02 PROCEDURE — 25800030 ZZH RX IP 258 OP 636: Performed by: PHYSICIAN ASSISTANT

## 2020-08-02 RX ORDER — OXYCODONE HYDROCHLORIDE 5 MG/1
5 TABLET ORAL EVERY 6 HOURS PRN
Qty: 12 TABLET | Refills: 0 | Status: SHIPPED | OUTPATIENT
Start: 2020-08-02

## 2020-08-02 RX ORDER — OXYCODONE HYDROCHLORIDE 5 MG/1
5 TABLET ORAL ONCE
Status: COMPLETED | OUTPATIENT
Start: 2020-08-02 | End: 2020-08-02

## 2020-08-02 RX ORDER — HYDROMORPHONE HYDROCHLORIDE 1 MG/ML
0.5 INJECTION, SOLUTION INTRAMUSCULAR; INTRAVENOUS; SUBCUTANEOUS ONCE
Status: COMPLETED | OUTPATIENT
Start: 2020-08-02 | End: 2020-08-02

## 2020-08-02 RX ORDER — IOPAMIDOL 755 MG/ML
80 INJECTION, SOLUTION INTRAVASCULAR ONCE
Status: COMPLETED | OUTPATIENT
Start: 2020-08-02 | End: 2020-08-02

## 2020-08-02 RX ADMIN — HYDROMORPHONE HYDROCHLORIDE 0.5 MG: 1 INJECTION, SOLUTION INTRAMUSCULAR; INTRAVENOUS; SUBCUTANEOUS at 10:39

## 2020-08-02 RX ADMIN — OXYCODONE HYDROCHLORIDE 5 MG: 5 TABLET ORAL at 12:16

## 2020-08-02 RX ADMIN — HYDROMORPHONE HYDROCHLORIDE 0.5 MG: 1 INJECTION, SOLUTION INTRAMUSCULAR; INTRAVENOUS; SUBCUTANEOUS at 11:35

## 2020-08-02 RX ADMIN — SODIUM CHLORIDE 1000 ML: 9 INJECTION, SOLUTION INTRAVENOUS at 10:42

## 2020-08-02 RX ADMIN — IOPAMIDOL 80 ML: 755 INJECTION, SOLUTION INTRAVENOUS at 11:00

## 2020-08-02 ASSESSMENT — ENCOUNTER SYMPTOMS
HEMATURIA: 0
NUMBNESS: 1
HEADACHES: 0
NECK PAIN: 0
ARTHRALGIAS: 1
SHORTNESS OF BREATH: 1

## 2020-08-02 ASSESSMENT — MIFFLIN-ST. JEOR: SCORE: 1709.69
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
## 2020-08-02 NOTE — ED AVS SNAPSHOT
[facility] Emergency Department  [address]  [facility] [address]  Phone:  [phone]  Fax:  [phone]                                    Yuniel Sultana   MRN: [id number]    Department:  [facility] Emergency Department   Date of Visit:  8/2/2020           After Visit Summary Signature Page    I have received my discharge instructions, and my questions have been answered. I have discussed any challenges I see with this plan with the nurse or doctor.    ..........................................................................................................................................  Patient/Patient Representative Signature      ..........................................................................................................................................  Patient Representative Print Name and Relationship to Patient    ..................................................               ................................................  Date                                   Time    ..........................................................................................................................................  Reviewed by Signature/Title    ...................................................              ..............................................  Date                                               Time          22EPIC Rev 08/18

## 2020-12-22 ENCOUNTER — OFFICE VISIT (OUTPATIENT)
Dept: URGENT CARE | Facility: URGENT CARE | Age: 30
End: 2020-12-22
Payer: COMMERCIAL

## 2020-12-22 ENCOUNTER — NURSE TRIAGE (OUTPATIENT)
Dept: NURSING | Facility: CLINIC | Age: 30
End: 2020-12-22

## 2020-12-22 VITALS
WEIGHT: 167.6 LBS | BODY MASS INDEX: 24.05 KG/M2 | HEART RATE: 102 BPM | OXYGEN SATURATION: 97 % | DIASTOLIC BLOOD PRESSURE: 72 MMHG | TEMPERATURE: 98.1 F | RESPIRATION RATE: 16 BRPM | SYSTOLIC BLOOD PRESSURE: 108 MMHG

## 2020-12-22 DIAGNOSIS — L73.8 BACTERIAL FOLLICULITIS: Primary | ICD-10-CM

## 2020-12-22 PROCEDURE — 99213 OFFICE O/P EST LOW 20 MIN: CPT | Performed by: FAMILY MEDICINE

## 2020-12-23 NOTE — PROGRESS NOTES
SUBJECTIVE:   Chief Complaint   Patient presents with     Facial Swelling     Left side swelling spot on left cheek, swelling worsened sx started this morning     Yuniel Sultana is a 30 year old male who presents for evaluation of and area of redness, tenderness, swelling and warmth of the skin that developed on the  left, cheek of the face   .  Patient has had pain, skin erythema (reddened skin) and tenderness for 1 day.    Precipitating event was unknown,  .    Therapies tried: none.    Past Medical History:   Diagnosis Date     ADD (attention deficit disorder with hyperactivity)      Thyroid disease      Patient Active Problem List   Diagnosis     Facial nerve palsy     Other back pain, unspecified chronicity     Left Achilles tendinitis     Achilles tendonosis of left lower extremity       ALLERGIES:  Patient has no known allergies.         cephALEXin (KEFLEX) 500 MG capsule, Take 1 capsule (500 mg) by mouth 3 times daily       HYDROcodone-acetaminophen (NORCO) 5-325 MG per tablet, Take 1 tablet by mouth every 6 hours as needed for severe pain       oxyCODONE (ROXICODONE) 5 MG tablet, Take 1 tablet (5 mg) by mouth every 6 hours as needed for pain    No current facility-administered medications on file prior to visit.       Social History     Tobacco Use     Smoking status: Current Every Day Smoker     Packs/day: 0.25     Smokeless tobacco: Current User     Tobacco comment: 9/2016   Substance Use Topics     Alcohol use: Yes     Alcohol/week: 0.0 standard drinks     Comment: occ       No family history on file.    ROS:  CONSTITUTIONAL:NEGATIVE for fever, chills,    EYES: NEGATIVE for vision changes or irritation  ENT/MOUTH: NEGATIVE for ear, mouth and throat problems  RESP:NEGATIVE for significant cough or SOB    OBJECTIVE:  /72   Pulse 102   Temp 98.1  F (36.7  C) (Oral)   Resp 16   Wt 76 kg (167 lb 9.6 oz)   SpO2 97%   BMI 24.05 kg/m      SKIN: area involved is 1 cm by 1 cm on the left,  anterior  cheek of the face.   The skin appear erythematous, moderately swollen, with tenderness and warmth to the touch.  With palpation of the cheek tissue has swollen , tender mass the size of a small marble       GENERAL:  Alert, mild distress  EYES: EOMI,   conjunctiva clear  HENT: External ears with no swelling or lesions   Nose and lips without  Swelling, ulcers, erythema or lesions  NECK: normal pain free ROM  RESP: no labored respirations, no tachypnea  EXTREMITIES:   Full ROM without expression of pain or limitation x 4 extremities  NEURO: Normal strength and tone, ambulation without difficulty,   normal speech and mentation  PSYCH: mentation and affect appears normal and patient appearance--appropriately groomed     ASSESSMENT:  Bacterial folliculitis    Left cheek of the face  - amoxicillin-clavulanate (AUGMENTIN) 875-125 MG tablet; Take 1 tablet by mouth 2 times daily    patient to monitor for signs or symptoms of worsening/ spreading infection.  Go to Urgent care or Emergency Department if worsening fevers/chills and/or spreading infection.  Warm, moist packs or towels can be applied to the region to aid in resolution of the infection.  Use Tylenol or ibuprofen for pain or fever.

## 2020-12-23 NOTE — PATIENT INSTRUCTIONS
Patient Education     Folliculitis  Folliculitis is an infection of a hair follicle. A hair follicle is the little pocket where a hair grows out of the skin. Bacteria normally live on the skin. But sometimes bacteria can get trapped in a follicle and cause infection. This causes a bumpy rash. The area over the follicles is red and raised. It may itch or be painful. The bumps may have fluid (pus) inside. The pus may leak and then form crusts. Sores can spread to other areas of the body. Once it goes away, folliculitis can come back at any time. Severe cases may cause lasting (permanent) hair loss and scarring.   Folliculitis can happen anywhere on the body where hair grows. It can be caused by rubbing from tight clothing. Ingrown hairs can cause it. Soaking in a hot tub or swimming pool that has bacteria in the water can cause it. It may also occur if a hair follicle is blocked by a bandage.   Sores often go away in a few days with no treatment. In some cases, medicine may be given. A small piece of skin or pus may be taken to find the type of bacteria causing the infection.   Home care  The healthcare provider may prescribe an antibiotic cream or ointment. Antibiotics taken by mouth (oral) may also be prescribed. Or you may be told to use an over-the-counter antibiotic cream. Follow all instructions when using any of these medicines.   General care    Apply warm, moist compresses to the sores for 20 minutes up to 3 times a day. You can make a compress by soaking a cloth in warm water. Squeeze out excess water.    Don t cut, poke, or squeeze the sores. This can be painful and spread infection.    Don t scratch the affected area. Scratching can delay healing.    Don t shave the areas affected by folliculitis.    If the sores leak fluid, cover the area with a nonstick gauze bandage. Use as little tape as possible. Carefully get rid of all soiled bandages.    Dress in loose cotton clothing.    Change sheets and  blankets if they are soiled by pus. Wash all clothes, towels, sheets, and cloth diapers in soap and hot water. Don't share clothes, towels, or sheets with other family members.    Don't soak the sores in bath water. This can spread infection. Instead keep the area clean by gently washing sores with soap and warm water.    Wash your hands or use antibacterial gels often to prevent spreading the bacteria.    Follow-up care  Follow up with your healthcare provider, or as advised.  When to get medical advice  Call your healthcare provider right away if any of these occur:    Fever of 100.4 F (38 C) or higher, or as directed by your provider    Rash spreads    Rash does not get better with treatment    Redness or swelling that gets worse    Rash becomes more painful    Foul-smelling fluid leaking from the skin    Rash improves, but then comes back   Marie last reviewed this educational content on 8/1/2019 2000-2020 [employer]. [address] [place] [address], [place] [address]. All rights reserved. This information is not intended as a substitute for professional medical care. Always follow your healthcare professional's instructions.

## 2020-12-23 NOTE — TELEPHONE ENCOUNTER
Yuniel reports a new onset, progressively worsening, painful swelling to his left cheek.    This morning, he noticed tenderness to his left lower cheek, near the corner of his mouth.   As the day progressed, swelling developed, redness and increased tenderness    The swelling now extends up to his left eye, but does not involve his eye.  Pain has increased. The swelling is painful to touch.    There is an area that is hard reddened and irregularly shaped. This is from the corner of his mouth to the corner of his nose.    Per protocol, advised to be seen by HCP within 4 hours or contact PCP  He does not have a PCP or regular clinic.  Urgent care suggested.    COVID 19 Nurse Triage Plan/Patient Instructions    Please be aware that novel coronavirus (COVID-19) may be circulating in the community. If you develop symptoms such as fever, cough, or SOB or if you have concerns about the presence of another infection including coronavirus (COVID-19), please contact your health care provider or visit [email or web address].     Disposition/Instructions    In-Person Visit with provider recommended. Reference Visit Selection Guide.    Thank you for taking steps to prevent the spread of this virus.  o Limit your contact with others.  o Wear a simple mask to cover your cough.  o Wash your hands well and often.    Resources    [facility]: About COVID-19: [email or web address]    CDC: What to Do If You're Sick: www.cdc.gov/coronavirus/2019-ncov/about/steps-when-sick.html    CDC: Ending Home Isolation: www.cdc.gov/coronavirus/2019-ncov/hcp/disposition-in-home-patients.html     CDC: Caring for Someone: www.cdc.gov/coronavirus/2019-ncov/if-you-are-sick/care-for-someone.html     [facility]: Interim Guidance for Hospital Discharge to Home: www.health.[facility].[place]/diseases/coronavirus/hcp/hospdischarge.pdf    [facility] clinical trials (COVID-19 research studies): [email or web address]     Below are  the COVID-19 hotlines at the [place] Department of Health ([facility]). Interpreters are available.   o For health questions: Call 433-407-3065 or 1-110.829.8853 (7 a.m. to 7 p.m.)  o For questions about schools and childcare: Call 622-534-2698 or 1-924.312.9190 (7 a.m. to 7 p.m.)     Jessica Roque RN  [facility]      Additional Information    Negative: [1] Life-threatening reaction (anaphylaxis) in the past to similar substance (e.g., food, insect bite/sting, chemical, etc.) AND [2] < 2 hours since exposure    Negative: Unresponsive, passed out or very weak    Negative: Swollen tongue    Negative: Difficulty breathing or wheezing    Negative: Sounds like a life-threatening emergency to the triager    Negative: [1] SEVERE swelling of entire face AND [2] < 2 hours since exposure to high-risk allergen (e.g., peanuts, tree nuts, fish, shellfish or 1st dose of drug) AND [3] no serious symptoms AND [4] no serious allergic reaction in the past    Negative: Fever    Negative: Taking an ACE Inhibitor medication  (e.g., benazepril/LOTENSIN, captopril/CAPOTEN, enalapril/VASOTEC, lisinopril/ZESTRIL)    Negative: Patient sounds very sick or weak to the triager    Negative: Pregnant > 20 weeks    Negative: Postpartum (i.e. < 1 month since delivery)    Negative: SEVERE swelling of the entire face    [1] Swelling is red AND [2] very painful to touch    Protocols used: FACE SWELLING-A-AH

## 2020-12-23 NOTE — NURSING NOTE
"Vital signs:  Temp: 98.1  F (36.7  C) Temp src: Oral BP: 108/72 Pulse: 102   Resp: 16 SpO2: 97 %       Weight: 76 kg (167 lb 9.6 oz)  Estimated body mass index is 24.05 kg/m  as calculated from the following:    Height as of 8/2/20: 1.778 m (5' 10\").    Weight as of this encounter: 76 kg (167 lb 9.6 oz).          "